# Patient Record
Sex: FEMALE | Race: WHITE | NOT HISPANIC OR LATINO
[De-identification: names, ages, dates, MRNs, and addresses within clinical notes are randomized per-mention and may not be internally consistent; named-entity substitution may affect disease eponyms.]

---

## 2022-02-11 ENCOUNTER — APPOINTMENT (OUTPATIENT)
Dept: PEDIATRIC GASTROENTEROLOGY | Facility: CLINIC | Age: 22
End: 2022-02-11

## 2022-02-11 DIAGNOSIS — R89.4 ABNORMAL IMMUNOLOGICAL FINDINGS IN SPECIMENS FROM OTHER ORGANS, SYSTEMS AND TISSUES: ICD-10-CM

## 2022-02-11 DIAGNOSIS — Z80.8 FAMILY HISTORY OF MALIGNANT NEOPLASM OF OTHER ORGANS OR SYSTEMS: ICD-10-CM

## 2022-02-11 DIAGNOSIS — S54.20XA INJURY OF RADIAL NERVE AT FOREARM LEVEL, UNSPECIFIED ARM, INITIAL ENCOUNTER: ICD-10-CM

## 2022-02-11 DIAGNOSIS — F41.9 ANXIETY DISORDER, UNSPECIFIED: ICD-10-CM

## 2022-02-11 DIAGNOSIS — Z82.79 FAMILY HISTORY OF OTHER CONGENITAL MALFORMATIONS, DEFORMATIONS AND CHROMOSOMAL ABNORMALITIES: ICD-10-CM

## 2022-02-11 PROBLEM — Z00.00 ENCOUNTER FOR PREVENTIVE HEALTH EXAMINATION: Status: ACTIVE | Noted: 2022-02-11

## 2022-02-11 RX ORDER — CHROMIUM 200 MCG
1000 TABLET ORAL
Refills: 0 | Status: ACTIVE | COMMUNITY

## 2022-02-11 RX ORDER — ESCITALOPRAM OXALATE 5 MG/1
TABLET, FILM COATED ORAL
Refills: 0 | Status: ACTIVE | COMMUNITY

## 2022-02-14 ENCOUNTER — NON-APPOINTMENT (OUTPATIENT)
Age: 22
End: 2022-02-14

## 2022-02-14 VITALS — WEIGHT: 180 LBS | BODY MASS INDEX: 27.28 KG/M2 | HEIGHT: 68 IN

## 2022-02-17 DIAGNOSIS — E53.8 DEFICIENCY OF OTHER SPECIFIED B GROUP VITAMINS: ICD-10-CM

## 2022-03-15 ENCOUNTER — TRANSCRIPTION ENCOUNTER (OUTPATIENT)
Age: 22
End: 2022-03-15

## 2022-03-16 ENCOUNTER — RESULT REVIEW (OUTPATIENT)
Age: 22
End: 2022-03-16

## 2022-03-16 ENCOUNTER — OUTPATIENT (OUTPATIENT)
Dept: OUTPATIENT SERVICES | Age: 22
LOS: 1 days | Discharge: HOME | End: 2022-03-16
Payer: MEDICAID

## 2022-03-16 VITALS
SYSTOLIC BLOOD PRESSURE: 108 MMHG | HEART RATE: 82 BPM | OXYGEN SATURATION: 100 % | RESPIRATION RATE: 18 BRPM | DIASTOLIC BLOOD PRESSURE: 68 MMHG

## 2022-03-16 VITALS
OXYGEN SATURATION: 98 % | DIASTOLIC BLOOD PRESSURE: 78 MMHG | HEART RATE: 72 BPM | TEMPERATURE: 98 F | RESPIRATION RATE: 18 BRPM | SYSTOLIC BLOOD PRESSURE: 112 MMHG | WEIGHT: 190.26 LBS | HEIGHT: 68.9 IN

## 2022-03-16 DIAGNOSIS — K59.00 CONSTIPATION, UNSPECIFIED: ICD-10-CM

## 2022-03-16 LAB
ALBUMIN SERPL ELPH-MCNC: 4.4 G/DL
ALP BLD-CCNC: 78 U/L
ALT SERPL-CCNC: 7 U/L
AST SERPL-CCNC: 14 U/L
BILIRUB DIRECT SERPL-MCNC: 0.1 MG/DL
BILIRUB INDIRECT SERPL-MCNC: 0.2 MG/DL
BILIRUB SERPL-MCNC: 0.2 MG/DL
CHOLEST SERPL-MCNC: 133 MG/DL
HCG UR QL: NEGATIVE — SIGNIFICANT CHANGE UP
HDLC SERPL-MCNC: 45 MG/DL
IGA SER QL IEP: 198 MG/DL
LDLC SERPL CALC-MCNC: 78 MG/DL
NONHDLC SERPL-MCNC: 88 MG/DL
PROT SERPL-MCNC: 6.4 G/DL
TRIGL SERPL-MCNC: 50 MG/DL

## 2022-03-16 PROCEDURE — 88312 SPECIAL STAINS GROUP 1: CPT | Mod: 26

## 2022-03-16 PROCEDURE — 88305 TISSUE EXAM BY PATHOLOGIST: CPT | Mod: 26

## 2022-03-16 PROCEDURE — 43239 EGD BIOPSY SINGLE/MULTIPLE: CPT

## 2022-03-16 RX ORDER — OMEPRAZOLE 40 MG/1
40 CAPSULE, DELAYED RELEASE ORAL
Qty: 30 | Refills: 1 | Status: ACTIVE | COMMUNITY
Start: 2022-03-16 | End: 1900-01-01

## 2022-03-16 NOTE — ASU DISCHARGE PLAN (ADULT/PEDIATRIC) - NS MD DC FALL RISK RISK
For information on Fall & Injury Prevention, visit: https://www.[facility]/news/fall-prevention-protects-and-maintains-health-and-mobility OR  https://www.[facility]/news/fall-prevention-tips-to-avoid-injury OR  https://www.cdc.gov/steadi/patient.html For information on Fall & Injury Prevention, visit: https://www.[facility]/news/fall-prevention-protects-and-maintains-health-and-mobility OR  https://www.[facility]/news/fall-prevention-tips-to-avoid-injury OR  https://www.cdc.gov/steadi/patient.html For information on Fall & Injury Prevention, visit: https://www.[facility]/news/fall-prevention-protects-and-maintains-health-and-mobility OR  https://www.[facility]/news/fall-prevention-tips-to-avoid-injury OR  https://www.cdc.gov/steadi/patient.html

## 2022-03-16 NOTE — ASU DISCHARGE PLAN (ADULT/PEDIATRIC) - PROVIDER TOKENS
PROVIDER:[TOKEN:[[id number]]] PROVIDER:[TOKEN:[[id number]]] PROVIDER:[TOKEN:[[id number]]]

## 2022-03-18 LAB
CRP SERPL-MCNC: <3 MG/L
ERYTHROCYTE [SEDIMENTATION RATE] IN BLOOD BY WESTERGREN METHOD: 5 MM/HR

## 2022-03-20 LAB
25(OH)D3 SERPL-MCNC: 21.8 NG/ML
TTG IGA SER IA-ACNC: 89.3 U/ML
TTG IGA SER-ACNC: POSITIVE

## 2022-03-21 LAB
25(OH)D3 SERPL-MCNC: 18.1 NG/ML
ALBUMIN SERPL ELPH-MCNC: 4.5 G/DL
ALP BLD-CCNC: 77 U/L
ALT SERPL-CCNC: 7 U/L
ANION GAP SERPL CALC-SCNC: 12 MMOL/L
AST SERPL-CCNC: 12 U/L
BASOPHILS # BLD AUTO: 0.06 K/UL
BASOPHILS NFR BLD AUTO: 1 %
BILIRUB SERPL-MCNC: 0.2 MG/DL
BUN SERPL-MCNC: 8 MG/DL
CALCIUM SERPL-MCNC: 9.4 MG/DL
CHLORIDE SERPL-SCNC: 106 MMOL/L
CO2 SERPL-SCNC: 23 MMOL/L
CREAT SERPL-MCNC: 0.62 MG/DL
EGFR: 131 ML/MIN/1.73M2
ENDOMYSIUM IGA SER QL: POSITIVE
ENDOMYSIUM IGA SER QL: POSITIVE
ENDOMYSIUM IGA TITR SER: ABNORMAL
ENDOMYSIUM IGA TITR SER: ABNORMAL
EOSINOPHIL # BLD AUTO: 0.46 K/UL
EOSINOPHIL NFR BLD AUTO: 7.9 %
FERRITIN SERPL-MCNC: 46 NG/ML
FOLATE SERPL-MCNC: 5 NG/ML
GLIADIN IGA SER QL: 26.8 UNITS
GLIADIN IGG SER QL: 50.3 UNITS
GLIADIN PEPTIDE IGA SER-ACNC: ABNORMAL
GLIADIN PEPTIDE IGG SER-ACNC: POSITIVE
GLUCOSE SERPL-MCNC: 98 MG/DL
HCT VFR BLD CALC: 40.8 %
HGB BLD-MCNC: 12.9 G/DL
IGA SER QL IEP: 198 MG/DL
IMM GRANULOCYTES NFR BLD AUTO: 0.2 %
IRON SATN MFR SERPL: 15 %
IRON SERPL-MCNC: 39 UG/DL
LYMPHOCYTES # BLD AUTO: 1.57 K/UL
LYMPHOCYTES NFR BLD AUTO: 26.8 %
MAN DIFF?: NORMAL
MCHC RBC-ENTMCNC: 28.6 PG
MCHC RBC-ENTMCNC: 31.6 GM/DL
MCV RBC AUTO: 90.5 FL
MONOCYTES # BLD AUTO: 0.38 K/UL
MONOCYTES NFR BLD AUTO: 6.5 %
NEUTROPHILS # BLD AUTO: 3.37 K/UL
NEUTROPHILS NFR BLD AUTO: 57.6 %
PLATELET # BLD AUTO: 339 K/UL
POTASSIUM SERPL-SCNC: 4.4 MMOL/L
PROT SERPL-MCNC: 6.5 G/DL
RBC # BLD: 4.51 M/UL
RBC # FLD: 11.9 %
SODIUM SERPL-SCNC: 141 MMOL/L
TIBC SERPL-MCNC: 250 UG/DL
TTG IGA SER IA-ACNC: 62.9 U/ML
TTG IGA SER-ACNC: POSITIVE
TTG IGG SER IA-ACNC: 10.5 U/ML
TTG IGG SER IA-ACNC: POSITIVE
UIBC SERPL-MCNC: 211 UG/DL
VIT B12 SERPL-MCNC: 564 PG/ML
WBC # FLD AUTO: 5.85 K/UL

## 2022-03-22 LAB
SURGICAL PATHOLOGY STUDY: SIGNIFICANT CHANGE UP

## 2022-03-25 ENCOUNTER — APPOINTMENT (OUTPATIENT)
Dept: PEDIATRIC GASTROENTEROLOGY | Facility: CLINIC | Age: 22
End: 2022-03-25
Payer: MEDICAID

## 2022-03-25 VITALS
BODY MASS INDEX: 28.67 KG/M2 | DIASTOLIC BLOOD PRESSURE: 75 MMHG | SYSTOLIC BLOOD PRESSURE: 118 MMHG | HEIGHT: 68.15 IN | WEIGHT: 189.16 LBS | HEART RATE: 79 BPM

## 2022-03-25 DIAGNOSIS — E55.9 VITAMIN D DEFICIENCY, UNSPECIFIED: ICD-10-CM

## 2022-03-25 DIAGNOSIS — R10.9 UNSPECIFIED ABDOMINAL PAIN: ICD-10-CM

## 2022-03-25 DIAGNOSIS — R14.0 ABDOMINAL DISTENSION (GASEOUS): ICD-10-CM

## 2022-03-25 DIAGNOSIS — Z83.79 FAMILY HISTORY OF OTHER DISEASES OF THE DIGESTIVE SYSTEM: ICD-10-CM

## 2022-03-25 DIAGNOSIS — K59.00 CONSTIPATION, UNSPECIFIED: ICD-10-CM

## 2022-03-25 PROCEDURE — 99204 OFFICE O/P NEW MOD 45 MIN: CPT

## 2022-03-25 PROCEDURE — ZZZZZ: CPT

## 2022-03-26 PROBLEM — K59.00 CONSTIPATION: Status: ACTIVE | Noted: 2022-02-11

## 2022-03-26 PROBLEM — Z83.79 FAMILY HISTORY OF CELIAC DISEASE: Status: ACTIVE | Noted: 2022-02-11

## 2022-03-26 PROBLEM — R14.0 ABDOMINAL BLOATING: Status: ACTIVE | Noted: 2022-02-11

## 2022-03-26 PROBLEM — E55.9 VITAMIN D DEFICIENCY: Status: ACTIVE | Noted: 2022-02-11

## 2022-03-27 NOTE — CONSULT LETTER
Was the patient seen in the last year in this department? Yes     Does patient have an active prescription for medications requested? Yes     Received Request Via: Patient     Patient request gabapentin to be resent to [facility] pharmacy on file.         [Dear  ___] : Dear  [unfilled], [Consult Letter:] : I had the pleasure of evaluating your patient, [unfilled]. [Please see my note below.] : Please see my note below. [Consult Closing:] : Thank you very much for allowing me to participate in the care of this patient.  If you have any questions, please do not hesitate to contact me. [Sincerely,] : Sincerely, [FreeTextEntry3] : Natalie Shea, RN, MSN, CPNP\par Certified Pediatric Nurse Practitioner \par Jason Rey MD MS\par [facility]\par

## 2022-03-27 NOTE — HISTORY OF PRESENT ILLNESS
[de-identified] : Almost 22 year old, hx of anxiety, here today for evaluation of recent diagnosis of celiac disease.\par \par She presented with a chronic history of abdominal pain, bloating, and nausea and was found to elevated celiac serology of lab evaluation obtained by her pediatrician in July 2021. She eventually presented to our team in February and underwent endoscopic evaluation on March 16. Grossly her endoscopy demonstrated multiple non-bleeding ulcers in the pre-pyloric antrum as well as a few superficial non-bleeding ulcers in the first, second and third part of the duodenum. Her bx were consistent with celiac disease although also concerning for possible additional inflammatory process. She has slowly begun to incorporate gluten free items into her diet and has been on Omeprazole 40 mg BID without difference in her symptoms. She continues to experience nausea, abdominal pain, and abdominal distension with essentially every meal. There is no emesis. She is defecating twice daily formed without gross blood. No nocturnal symptoms. No systemic symptoms other than fatigue and scaly rash on finger and face which seemed to resolve with topical tx for eczema. No ETOH use. Using NSAIDs on a PRN basis during menstrual cycle. \par Strong paternal family hx of celiac disease. \par \par BX results:\par distal & bulb duodenum- duodenal mucosa with duodenitis, including villous blunting/atrophy, acute cryptitis, increased lamina propria cellularity, and focal borderline intraepithelial lymphocytosis\par Antral bx with fragment of fibrinopurulent exudate, suggestive of erosion/ulceration; warthin-Starry stain negative

## 2022-03-27 NOTE — ASSESSMENT
[Educated Patient & Family about Diagnosis] : educated the patient and family about the diagnosis [FreeTextEntry1] : Mariaelena is a almost 22 year old with who underwent recent endoscopic evaluation for celiac disease who was found to have multiple gastric and duodenal ulcers concerning for the possibility of concomitant IBD . \par \par Recommended plan\par Strict gluten free diet\par Omeprazole 40 mg QD\par Submit fecal calprotectin \par Pending calprotectin results and response to gluten free diet will decide where further evaluation for IBD is warranted\par Next steps likely VCE (pt provided patency capsule in anticipation for VCE)\par Follow up in 1-2 weeks for calprotectin results\par Call PRN

## 2022-03-27 NOTE — PHYSICAL EXAM
[Well Nourished] : well nourished [NAD] : in no acute distress [CTAB] : lungs clear to auscultation bilaterally [Moist & Pink Mucous Membranes] : moist and pink mucous membranes [Regular Rate and Rhythm] : regular rate and rhythm [Normal S1, S2] : normal S1 and S2 [Soft] : soft  [Obese] : obese [Normal Bowel Sounds] : normal bowel sounds [No HSM] : no hepatosplenomegaly appreciated [Normal Tone] : normal tone [Well-Perfused] : well-perfused [Interactive] : interactive [Pallor] : no pallor [icteric] : anicteric [Respiratory Distress] : no respiratory distress  [Distended] : non distended [Tender] : non tender [Edema] : no edema [Cyanosis] : no cyanosis [Rash] : no rash [Jaundice] : no jaundice

## 2022-04-01 ENCOUNTER — NON-APPOINTMENT (OUTPATIENT)
Age: 22
End: 2022-04-01

## 2022-04-01 LAB — CALPROTECTIN FECAL: <16 UG/G

## 2022-07-15 ENCOUNTER — APPOINTMENT (OUTPATIENT)
Dept: PEDIATRIC GASTROENTEROLOGY | Facility: CLINIC | Age: 22
End: 2022-07-15

## 2022-07-15 VITALS
WEIGHT: 185.41 LBS | DIASTOLIC BLOOD PRESSURE: 74 MMHG | SYSTOLIC BLOOD PRESSURE: 114 MMHG | HEIGHT: 68.23 IN | BODY MASS INDEX: 28.1 KG/M2 | HEART RATE: 79 BPM

## 2022-07-15 DIAGNOSIS — K90.0 CELIAC DISEASE: ICD-10-CM

## 2022-07-15 DIAGNOSIS — K25.9 GASTRIC ULCER, UNSPECIFIED AS ACUTE OR CHRONIC, W/OUT HEMORRHAGE OR PERFORATION: ICD-10-CM

## 2022-07-15 PROCEDURE — 99214 OFFICE O/P EST MOD 30 MIN: CPT

## 2022-07-18 PROBLEM — K90.0 CELIAC DISEASE: Status: ACTIVE | Noted: 2022-03-26

## 2022-07-18 PROBLEM — K25.9 GASTRIC ULCER: Status: ACTIVE | Noted: 2022-03-25

## 2022-07-20 NOTE — HISTORY OF PRESENT ILLNESS
[de-identified] : 22 year old, hx of anxiety, here today for follow up of recent diagnosis of celiac disease and ulceration on endoscopic evaluation in prepyloric antrum and in the duodenum.\par \par Overview: She presented with a chronic history of abdominal pain, bloating, and nausea and was found to elevated celiac serology of lab evaluation obtained by her pediatrician in July 2021. She eventually presented to our team in February and underwent endoscopic evaluation on March 16. Grossly her endoscopy demonstrated multiple non-bleeding ulcers in the pre-pyloric antrum as well as a few superficial non-bleeding ulcers in the first, second and third part of the duodenum. Her bx were consistent with celiac disease although also concerning for possible additional inflammatory process. She had started a gluten free diet with initiation of Omeprazole 40 mg BID without difference in her symptoms. She continued to experience nausea, abdominal pain, and abdominal distension with essentially every meal.  No systemic symptoms other than fatigue and scaly rash on finger and face which seemed to resolve with topical tx for eczema. No ETOH use. Using NSAIDs on a PRN basis during menstrual cycle. Calprotectin obtained which was <16.\par \par Interval hx: Has adapted to gluten free diet well and has been compliant. Had a few accidental exposures which resulted in abdominal pain. Otherwise, her GI complaints have completely resolved since initiating a gluten free diet. She is defecating well without constipation or diarrhea. She never completed PPI course as she felt it was ineffective. \par \par Strong paternal family hx of celiac disease. \par \par BX results:\par distal & bulb duodenum- duodenal mucosa with duodenitis, including villous blunting/atrophy, acute cryptitis, increased lamina propria cellularity, and focal borderline intraepithelial lymphocytosis\par Antral bx with fragment of fibrinopurulent exudate, suggestive of erosion/ulceration; warthin-Starry stain negative

## 2022-07-20 NOTE — ASSESSMENT
[Educated Patient & Family about Diagnosis] : educated the patient and family about the diagnosis [FreeTextEntry1] : Mariaelena is a 22 year old recently diagnosed with celiac disease who was found to have several ulcers in the antrum and duodenum who returns today with complete resolution of symptoms on gluten free diet. Given lack of GI complaints and normal fecal calprotectin, ulcerations are more likely secondary to celiac disease, which can have a chronic gastritis at time of diagnosis, rather than an alternative inflammatory process such as IBD.  Given extent of ulceration on endoscopy, will proceed with a repeat upper endoscopy to re-evaluate ulcerations and will obtain labs at that time. Call for questions, concerns, or worsening symptoms.

## 2022-07-20 NOTE — CONSULT LETTER
[Dear  ___] : Dear  [unfilled], [Consult Letter:] : I had the pleasure of evaluating your patient, [unfilled]. [Please see my note below.] : Please see my note below. [Consult Closing:] : Thank you very much for allowing me to participate in the care of this patient.  If you have any questions, please do not hesitate to contact me. [Sincerely,] : Sincerely, [FreeTextEntry3] : Natalie Shea, RN, MSN, CPNP\par Certified Pediatric Nurse Practitioner \par Jason Rey MD MS\par [facility]\par

## 2022-09-11 ENCOUNTER — NON-APPOINTMENT (OUTPATIENT)
Age: 22
End: 2022-09-11

## 2022-09-13 ENCOUNTER — TRANSCRIPTION ENCOUNTER (OUTPATIENT)
Age: 22
End: 2022-09-13

## 2022-09-14 ENCOUNTER — OUTPATIENT (OUTPATIENT)
Dept: OUTPATIENT SERVICES | Age: 22
LOS: 1 days | Discharge: HOME | End: 2022-09-14

## 2022-09-14 ENCOUNTER — RESULT REVIEW (OUTPATIENT)
Age: 22
End: 2022-09-14

## 2022-09-14 ENCOUNTER — TRANSCRIPTION ENCOUNTER (OUTPATIENT)
Age: 22
End: 2022-09-14

## 2022-09-14 VITALS
SYSTOLIC BLOOD PRESSURE: 115 MMHG | HEIGHT: 68.5 IN | TEMPERATURE: 98 F | DIASTOLIC BLOOD PRESSURE: 75 MMHG | WEIGHT: 189.38 LBS | OXYGEN SATURATION: 100 % | HEART RATE: 75 BPM | RESPIRATION RATE: 18 BRPM

## 2022-09-14 VITALS
OXYGEN SATURATION: 99 % | SYSTOLIC BLOOD PRESSURE: 109 MMHG | HEART RATE: 99 BPM | RESPIRATION RATE: 18 BRPM | DIASTOLIC BLOOD PRESSURE: 75 MMHG

## 2022-09-14 DIAGNOSIS — K59.00 CONSTIPATION, UNSPECIFIED: ICD-10-CM

## 2022-09-14 LAB
25(OH)D3 SERPL-MCNC: 19.1 NG/ML
BASOPHILS # BLD AUTO: 0.08 K/UL
BASOPHILS NFR BLD AUTO: 1 %
CRP SERPL-MCNC: <3 MG/L
EOSINOPHIL # BLD AUTO: 0.54 K/UL
EOSINOPHIL NFR BLD AUTO: 7.1 %
HCG UR QL: NEGATIVE — SIGNIFICANT CHANGE UP
HCT VFR BLD CALC: 40.2 %
HGB BLD-MCNC: 13.2 G/DL
IMM GRANULOCYTES NFR BLD AUTO: 0.3 %
LYMPHOCYTES # BLD AUTO: 2.29 K/UL
LYMPHOCYTES NFR BLD AUTO: 30 %
MAN DIFF?: NORMAL
MCHC RBC-ENTMCNC: 28.6 PG
MCHC RBC-ENTMCNC: 32.8 GM/DL
MCV RBC AUTO: 87.2 FL
MONOCYTES # BLD AUTO: 0.45 K/UL
MONOCYTES NFR BLD AUTO: 5.9 %
NEUTROPHILS # BLD AUTO: 4.25 K/UL
NEUTROPHILS NFR BLD AUTO: 55.7 %
PLATELET # BLD AUTO: 355 K/UL
RBC # BLD: 4.61 M/UL
RBC # FLD: 11.7 %
WBC # FLD AUTO: 7.63 K/UL

## 2022-09-14 PROCEDURE — 88305 TISSUE EXAM BY PATHOLOGIST: CPT | Mod: 26

## 2022-09-14 PROCEDURE — 43239 EGD BIOPSY SINGLE/MULTIPLE: CPT

## 2022-09-14 NOTE — ASU DISCHARGE PLAN (ADULT/PEDIATRIC) - PROVIDER TOKENS
PROVIDER:[TOKEN:[[id number]]] PROVIDER:[TOKEN:[[id number]]] PROVIDER:[TOKEN:[[id number]]]

## 2022-09-14 NOTE — ASU DISCHARGE PLAN (ADULT/PEDIATRIC) - CARE PROVIDER_API CALL
Jason Rey)  Pediatrics  [address], [address]  [place], [place] [id number]  Phone: [phone]  Fax: [phone]  Follow Up Time:    Jason Rey)  Pediatrics  [address] [facility] [address] [address] [facility], [address]  [place], [place] [id number]  Phone: [phone]  Fax: [phone]  Follow Up Time:    Jason Rey)  Pediatrics  [address] [facility] [address], [address]  [place], [place] [address]  Phone: [phone]  Fax: [phone]  Follow Up Time:

## 2022-09-14 NOTE — ASU DISCHARGE PLAN (ADULT/PEDIATRIC) - NS MD DC FALL RISK RISK
For information on Fall & Injury Prevention, visit: https://www.[facility]/news/fall-prevention-protects-and-maintains-health-and-mobility OR  https://www.[facility]/news/fall-prevention-tips-to-avoid-injury OR  https://www.cdc.gov/steadi/patient.html For information on Fall & Injury Prevention, visit: https://www.[facility]/news/fall-prevention-protects-and-maintains-health-and-mobility OR  https://www.[facility]/news/fall-prevention-tips-to-avoid-injury OR  https://www.cdc.gov/steadi/patient.html For information on Fall & Injury Prevention, visit: https://www.[facility]/news/fall-prevention-protects-and-maintains-health-and-mobility OR  https://www.[facility]/news/fall-prevention-tips-to-avoid-injury OR  https://www.cdc.gov/steadi/patient.html

## 2022-09-15 LAB
SURGICAL PATHOLOGY STUDY: SIGNIFICANT CHANGE UP

## 2022-09-19 ENCOUNTER — NON-APPOINTMENT (OUTPATIENT)
Age: 22
End: 2022-09-19

## 2022-09-19 LAB
TTG IGA SER IA-ACNC: 5.6 U/ML
TTG IGA SER-ACNC: ABNORMAL

## 2022-09-20 LAB
ENDOMYSIUM IGA SER QL: NEGATIVE
ENDOMYSIUM IGA TITR SER: NORMAL

## 2023-08-26 ENCOUNTER — APPOINTMENT (OUTPATIENT)
Dept: ORTHOPEDIC SURGERY | Facility: CLINIC | Age: 23
End: 2023-08-26
Payer: MEDICAID

## 2023-08-26 VITALS — WEIGHT: 185 LBS | HEIGHT: 68 IN | BODY MASS INDEX: 28.04 KG/M2

## 2023-08-26 DIAGNOSIS — M21.371 FOOT DROP, RIGHT FOOT: ICD-10-CM

## 2023-08-26 PROCEDURE — L4361: CPT | Mod: RT

## 2023-08-26 PROCEDURE — 72170 X-RAY EXAM OF PELVIS: CPT

## 2023-08-26 PROCEDURE — 72100 X-RAY EXAM L-S SPINE 2/3 VWS: CPT

## 2023-08-26 PROCEDURE — 99203 OFFICE O/P NEW LOW 30 MIN: CPT | Mod: 25

## 2023-08-26 PROCEDURE — 73630 X-RAY EXAM OF FOOT: CPT | Mod: RT

## 2023-08-26 NOTE — IMAGING
[Disc space narrowing] : Disc space narrowing [There are no fractures, subluxations or dislocations. No significant abnormalities are seen] : There are no fractures, subluxations or dislocations. No significant abnormalities are seen [FreeTextEntry1] : 5/1

## 2023-08-26 NOTE — PHYSICAL EXAM
[1___] : right dorsiflexors 1[unfilled]/5 [Right] : right foot and ankle [NL (40)] : plantar flexion 40 degrees [0___] : eversion 0[unfilled]/5 [2+] : dorsalis pedis pulse: 2+ [Absent] : superficial peroneal nerve sensation absent [] : ambulation with crutches [TWNoteComboBox7] : dorsiflexion -5 degrees

## 2023-08-26 NOTE — HISTORY OF PRESENT ILLNESS
[Right Leg] : right leg [0] : 0 [de-identified] : 24 y/o F here with her mother for further evaluation of her right foot. She states she cannot move it since yesterday and it feels numb. She states yesterday afternoon she was at the hairdresser sitting in a swivel chair when she twisted the chair, went to stand up and fell down to the ground. She states she is having a hard time walking due to "foot drop". She denies any back pain. No B/B issues. No previous leg/foot issues. She denies any recent illness. Pmhx: Denies.  [] : Post Surgical Visit: no [FreeTextEntry1] : Right foot [FreeTextEntry5] : Patient has numbness in her right foot since yesterday 8/25/23, unable to walk due to the numbness,  [FreeTextEntry6] : numbness

## 2023-08-26 NOTE — DISCUSSION/SUMMARY
[de-identified] : Case Discussed with patient and her mother.  Recommend cam boot for stability.  Will get a STAT mri lumbar spine for further evaluation.  Will also get an EMG for further evaluation. Advised to see spine surgeon/Neurosurgeon after mri for further evaluation.  All questions answered and she had a clear understanding.

## 2023-08-29 ENCOUNTER — RESULT REVIEW (OUTPATIENT)
Age: 23
End: 2023-08-29

## 2023-09-06 ENCOUNTER — NON-APPOINTMENT (OUTPATIENT)
Age: 23
End: 2023-09-06